# Patient Record
Sex: FEMALE | Race: WHITE | Employment: OTHER | URBAN - METROPOLITAN AREA
[De-identification: names, ages, dates, MRNs, and addresses within clinical notes are randomized per-mention and may not be internally consistent; named-entity substitution may affect disease eponyms.]

---

## 2024-04-17 NOTE — PROGRESS NOTES
[facility]      Patient not reached after multiple attempts to multiple numbers.   Pre-op instructions below left on [x] Voicemail/Number: [phone], message stated patient's last name. Requested patient call back to [phone].   Date: 4/30/24   Time: 1130  Arrival: 1000    1. Nothing to eat or drink after midnight-including, gum, candy, mints or ice. If colonoscopy, follow prep instructions. No alcohol, smoking or marijuana within 24 hours of surgery  2. If you normally take heart, blood pressure, seizure, breathing or thyroid medications in the morning please do so with a small sip of water. Use inhalers, bring rescue inhaler  3. Take half of your normal dose of any long-acting insulins the night before-do not take any diabetic medications the morning of surgery  4.  Follow your doctors instructions regarding blood thinners  5.  Bring a complete list of all your medications (prescriptions and over the counter), picture ID and insurance card. Leave all other valuables at home  6.  You need a responsible adult 18 or older to stay on site while you are here, drive you home and stay with you for 24 hours, they must have permission to receive your post-op instructions  7.  Follow any instructions your surgeon's office has given you  8.  Shower with antibacterial soap as directed by surgeon. Do not put anything on skin after including lotion or powders  9.  Wear loose, comfortable clothing. No jewelry, piercings or metal hair clips. No makeup or nail polish  10. Bring any assistive or medical devices that you may need and any the surgeon has advised are needed post operatively. If you use CPAP or oxygen, bring with you on the day of  procedure  11.  If you have a history of reactions to anesthesia, please notify us immediately  12. Call your surgeons office if you need to cancel for illness or any reason or for any further questions      [id number]

## 2024-04-29 ENCOUNTER — ANESTHESIA EVENT (OUTPATIENT)
Age: 79
End: 2024-04-29
Payer: MEDICARE

## 2024-04-29 RX ORDER — SODIUM CHLORIDE 0.9 % (FLUSH) 0.9 %
5-40 SYRINGE (ML) INJECTION EVERY 12 HOURS SCHEDULED
Status: CANCELLED | OUTPATIENT
Start: 2024-04-29

## 2024-04-29 RX ORDER — SODIUM CHLORIDE 9 MG/ML
INJECTION, SOLUTION INTRAVENOUS PRN
Status: CANCELLED | OUTPATIENT
Start: 2024-04-29

## 2024-04-29 RX ORDER — SODIUM CHLORIDE 0.9 % (FLUSH) 0.9 %
5-40 SYRINGE (ML) INJECTION PRN
Status: CANCELLED | OUTPATIENT
Start: 2024-04-29

## 2024-04-30 ENCOUNTER — HOSPITAL ENCOUNTER (OUTPATIENT)
Age: 79
Setting detail: OUTPATIENT SURGERY
Discharge: HOME | End: 2024-04-30
Attending: INTERNAL MEDICINE | Admitting: INTERNAL MEDICINE
Payer: MEDICARE

## 2024-04-30 ENCOUNTER — ANESTHESIA (OUTPATIENT)
Age: 79
End: 2024-04-30
Payer: MEDICARE

## 2024-04-30 VITALS
BODY MASS INDEX: 28.96 KG/M2 | RESPIRATION RATE: 26 BRPM | WEIGHT: 147.5 LBS | HEIGHT: 60 IN | TEMPERATURE: 98 F | OXYGEN SATURATION: 98 % | SYSTOLIC BLOOD PRESSURE: 156 MMHG | DIASTOLIC BLOOD PRESSURE: 100 MMHG | HEART RATE: 92 BPM

## 2024-04-30 DIAGNOSIS — R19.5 POSITIVE COLORECTAL CANCER SCREENING USING COLOGUARD TEST: ICD-10-CM

## 2024-04-30 LAB — GLUCOSE BLD-MCNC: 167 MG/DL (ref 70–99)

## 2024-04-30 PROCEDURE — 3700000001 HC ADD 15 MINUTES (ANESTHESIA): Performed by: INTERNAL MEDICINE

## 2024-04-30 PROCEDURE — 2580000003 HC RX 258: Performed by: ANESTHESIOLOGY

## 2024-04-30 PROCEDURE — 3609010600 HC COLONOSCOPY POLYPECTOMY SNARE/COLD BIOPSY: Performed by: INTERNAL MEDICINE

## 2024-04-30 PROCEDURE — 88305 TISSUE EXAM BY PATHOLOGIST: CPT

## 2024-04-30 PROCEDURE — 82962 GLUCOSE BLOOD TEST: CPT

## 2024-04-30 PROCEDURE — 2709999900 HC NON-CHARGEABLE SUPPLY: Performed by: INTERNAL MEDICINE

## 2024-04-30 PROCEDURE — 7100000011 HC PHASE II RECOVERY - ADDTL 15 MIN: Performed by: INTERNAL MEDICINE

## 2024-04-30 PROCEDURE — 6360000002 HC RX W HCPCS: Performed by: NURSE ANESTHETIST, CERTIFIED REGISTERED

## 2024-04-30 PROCEDURE — 7100000010 HC PHASE II RECOVERY - FIRST 15 MIN: Performed by: INTERNAL MEDICINE

## 2024-04-30 PROCEDURE — 3700000000 HC ANESTHESIA ATTENDED CARE: Performed by: INTERNAL MEDICINE

## 2024-04-30 RX ORDER — ATORVASTATIN CALCIUM 10 MG/1
10 TABLET, FILM COATED ORAL DAILY
COMMUNITY

## 2024-04-30 RX ORDER — PRAMIPEXOLE DIHYDROCHLORIDE 0.5 MG/1
TABLET ORAL
COMMUNITY
Start: 2024-02-13

## 2024-04-30 RX ORDER — NALOXONE HYDROCHLORIDE 0.4 MG/ML
INJECTION, SOLUTION INTRAMUSCULAR; INTRAVENOUS; SUBCUTANEOUS PRN
Status: DISCONTINUED | OUTPATIENT
Start: 2024-04-30 | End: 2024-04-30 | Stop reason: HOSPADM

## 2024-04-30 RX ORDER — LIDOCAINE HYDROCHLORIDE 20 MG/ML
INJECTION, SOLUTION INTRAVENOUS PRN
Status: DISCONTINUED | OUTPATIENT
Start: 2024-04-30 | End: 2024-04-30 | Stop reason: SDUPTHER

## 2024-04-30 RX ORDER — LISINOPRIL 2.5 MG/1
TABLET ORAL
COMMUNITY
Start: 2024-02-13

## 2024-04-30 RX ORDER — ESZOPICLONE 1 MG/1
TABLET, FILM COATED ORAL
COMMUNITY

## 2024-04-30 RX ORDER — DAPAGLIFLOZIN 10 MG/1
TABLET, FILM COATED ORAL
COMMUNITY
Start: 2024-02-28

## 2024-04-30 RX ORDER — PROPOFOL 10 MG/ML
INJECTION, EMULSION INTRAVENOUS PRN
Status: DISCONTINUED | OUTPATIENT
Start: 2024-04-30 | End: 2024-04-30 | Stop reason: SDUPTHER

## 2024-04-30 RX ORDER — PANTOPRAZOLE SODIUM 40 MG/1
TABLET, DELAYED RELEASE ORAL
COMMUNITY
Start: 2024-02-13

## 2024-04-30 RX ORDER — GLYCOPYRROLATE 0.2 MG/ML
INJECTION INTRAMUSCULAR; INTRAVENOUS PRN
Status: DISCONTINUED | OUTPATIENT
Start: 2024-04-30 | End: 2024-04-30 | Stop reason: SDUPTHER

## 2024-04-30 RX ORDER — ONDANSETRON 2 MG/ML
4 INJECTION INTRAMUSCULAR; INTRAVENOUS
Status: DISCONTINUED | OUTPATIENT
Start: 2024-04-30 | End: 2024-04-30 | Stop reason: HOSPADM

## 2024-04-30 RX ORDER — LORAZEPAM 0.5 MG/1
0.5 TABLET ORAL
COMMUNITY
Start: 2024-04-09

## 2024-04-30 RX ORDER — GLIPIZIDE 5 MG/1
TABLET ORAL
COMMUNITY
Start: 2024-02-28

## 2024-04-30 RX ORDER — SODIUM CHLORIDE 0.9 % (FLUSH) 0.9 %
5-40 SYRINGE (ML) INJECTION EVERY 12 HOURS SCHEDULED
Status: DISCONTINUED | OUTPATIENT
Start: 2024-04-30 | End: 2024-04-30 | Stop reason: HOSPADM

## 2024-04-30 RX ORDER — ROSUVASTATIN CALCIUM 40 MG/1
TABLET, COATED ORAL
COMMUNITY
Start: 2024-02-28

## 2024-04-30 RX ORDER — ONDANSETRON 4 MG/1
TABLET, FILM COATED ORAL
COMMUNITY
Start: 2017-07-15

## 2024-04-30 RX ORDER — VENLAFAXINE HYDROCHLORIDE 150 MG/1
150 CAPSULE, EXTENDED RELEASE ORAL
COMMUNITY
Start: 2024-04-15

## 2024-04-30 RX ORDER — SODIUM CHLORIDE 9 MG/ML
INJECTION, SOLUTION INTRAVENOUS PRN
Status: DISCONTINUED | OUTPATIENT
Start: 2024-04-30 | End: 2024-04-30 | Stop reason: HOSPADM

## 2024-04-30 RX ORDER — SODIUM CHLORIDE 0.9 % (FLUSH) 0.9 %
5-40 SYRINGE (ML) INJECTION PRN
Status: DISCONTINUED | OUTPATIENT
Start: 2024-04-30 | End: 2024-04-30 | Stop reason: HOSPADM

## 2024-04-30 RX ORDER — SCOLOPAMINE TRANSDERMAL SYSTEM 1 MG/1
1 PATCH, EXTENDED RELEASE TRANSDERMAL PRN
COMMUNITY
Start: 2017-05-06

## 2024-04-30 RX ORDER — PROMETHAZINE HYDROCHLORIDE 12.5 MG/1
12.5 TABLET ORAL EVERY 8 HOURS PRN
COMMUNITY
Start: 2021-10-14

## 2024-04-30 RX ORDER — DROPERIDOL 2.5 MG/ML
0.62 INJECTION, SOLUTION INTRAMUSCULAR; INTRAVENOUS
Status: DISCONTINUED | OUTPATIENT
Start: 2024-04-30 | End: 2024-04-30 | Stop reason: HOSPADM

## 2024-04-30 RX ADMIN — PROPOFOL 40 MG: 10 INJECTION, EMULSION INTRAVENOUS at 11:30

## 2024-04-30 RX ADMIN — PROPOFOL 140 MCG/KG/MIN: 10 INJECTION, EMULSION INTRAVENOUS at 11:31

## 2024-04-30 RX ADMIN — LIDOCAINE HYDROCHLORIDE 100 MG: 20 INJECTION, SOLUTION INTRAVENOUS at 11:30

## 2024-04-30 RX ADMIN — GLYCOPYRROLATE 0.1 MG: 0.2 INJECTION, SOLUTION INTRAMUSCULAR; INTRAVENOUS at 11:23

## 2024-04-30 RX ADMIN — PROPOFOL 10 MG: 10 INJECTION, EMULSION INTRAVENOUS at 11:50

## 2024-04-30 RX ADMIN — SODIUM CHLORIDE: 9 INJECTION, SOLUTION INTRAVENOUS at 11:11

## 2024-04-30 ASSESSMENT — LIFESTYLE VARIABLES: SMOKING_STATUS: 1

## 2024-04-30 ASSESSMENT — PAIN - FUNCTIONAL ASSESSMENT: PAIN_FUNCTIONAL_ASSESSMENT: 0-10

## 2024-04-30 NOTE — PROGRESS NOTES
Ambulatory Surgery/Procedure Discharge Note    Vitals:    04/30/24 1225   BP: (!) 156/100   Pulse: 92   Resp: 26   Temp: 98 °F (36.7 °C)   SpO2: 98%       In: 400 [I.V.:400]  Out: -       Pain assessment:  none  Pain Level: 0  Pt states \"ready to go home\". Pt alert and oriented x4. IV removed. Denies N/V, tolerating PO intake. Discharge instructions given to pt and  ,verbalized understanding of all instructions. Left with all belongings and discharge instructions.       Patient discharged to home/self care. Patient discharged via wheel chair by RN to waiting family/S.O.       4/30/2024 12:47 PM

## 2024-04-30 NOTE — DISCHARGE INSTRUCTIONS
ENDOSCOPY DISCHARGE INSTRUCTIONS:    Call the physician that did your procedure for any questions or concern:    [facility]: [phone]  DR. LIBORIO SORIA     ACTIVITY:    There are potential side effects to the medications used for sedation and anesthesia during your procedure.  These include:  Dizziness or light-headedness, confusion or memory loss, delayed reaction times, loss of coordination, nausea and vomiting.  Because of your increased risk for injury, we ask that you observe the following precautions:  For the next 24 hours,  DO NOT operate an automobile, bicycle, motorcycle, , power tools or large equipment of any kind.  Do not drink alcohol, sign any legal documents or make any legal decisions for 24 hours.  Do not bend your head over lower than your heart.  DO sit on the side of bed/couch awhile before getting up.  Plan on bedrest or quiet relaxation today.  You may resume normal activities in 24 hours.    DIET:    Your first meal today should be light, avoiding spicy and fatty foods.  If you tolerate this first meal, then you may advance to your regular diet unless otherwise advised by your physician.    NORMAL SYMPTOMS:  -Mild sore throat if you’ve had an EGD   -Gaseous discomfort    NOTIFY YOUR PHYSICIAN IF THESE SYMPTOMS OCCUR:  1. Fever (greater than 100)  5. Increased abdominal bloating  2. Severe pain    6. Excessive bleeding  3. Nausea and vomiting  7. Chest pain                                                                    4. Chills    8. Shortness of breath    ADDITIONAL INSTRUCTIONS:    Biopsy results: Call [facility] for biopsy results in 1 week    Educational Information:    Repeat colonoscopy in 3years.    Your I.V. Site: Care Instructions  Overview     Medicines or fluids may be given through an intravenous (I.V.) tube inserted into a vein. The I.V. is most often placed in the back of the hand, on the forearm, or on the inside of the elbow.  When the I.V. is in place,

## 2024-04-30 NOTE — H&P
Gastroenterology Note             Pre-operative History and Physical    Patient: Dalia Gomez  : 1945  CSN:     History Obtained From:  patient and/or guardian.     HISTORY OF PRESENT ILLNESS:    The patient is a 79 y.o. female  here for /colonoscopy.  This very pleasant 79-year-old female who comes in today she has been found to have a positive Cologuard test and we need to do a    Past Medical History:    Past Medical History:   Diagnosis Date    Diabetes mellitus (HCC)     Hyperlipidemia     Hypertension      Past Surgical History:    Past Surgical History:   Procedure Laterality Date    FRACTURE SURGERY       Medications Prior to Admission:   No current facility-administered medications on file prior to encounter.     Current Outpatient Medications on File Prior to Encounter   Medication Sig Dispense Refill    LORazepam (ATIVAN) 0.5 MG tablet 1 tablet.      lisinopril (PRINIVIL;ZESTRIL) 2.5 MG tablet TAKE 1 TABLET BY MOUTH EVERY DAY Oral for 90 Days      ondansetron (ZOFRAN) 4 MG tablet TAKE 1 TABLET BY MOUTH EVERY 8 HOURS AS NEEDED FOR NAUSEA OR VOMITING Oral for 7 Days      pantoprazole (PROTONIX) 40 MG tablet Oral for 90 Days      pramipexole (MIRAPEX) 0.5 MG tablet TAKE 1 TABLET BY MOUTH AT BEDTIME FOR RESTLESS LEG SYNDROME Oral for 90 Days      promethazine (PHENERGAN) 12.5 MG tablet Take 1 tablet by mouth every 8 hours as needed      rosuvastatin (CRESTOR) 40 MG tablet Oral for 90 Days      scopolamine (TRANSDERM SCOP, 1.5 MG,) transdermal patch Place 1 patch onto the skin as needed      venlafaxine (EFFEXOR XR) 150 MG extended release capsule Take 1 capsule by mouth daily (with breakfast)      dapagliflozin (FARXIGA) 10 MG tablet Oral for 90 Days      glipiZIDE (GLUCOTROL) 5 MG tablet TAKE 1 TABLET BY MOUTH 2 TIMES A DAY BEFORE MEALS Oral for 90 Days      atorvastatin (LIPITOR) 10 MG tablet Take 1 tablet by mouth daily      METFORMIN HCL PO Take by mouth daily      eszopiclone (LUNESTA) 1

## 2024-04-30 NOTE — ANESTHESIA POSTPROCEDURE EVALUATION
Department of Anesthesiology  Postprocedure Note    Patient: Dalia Gomez  MRN: [id number]  YOB: 1945  Date of evaluation: 4/30/2024    Procedure Summary       Date: 04/30/24 Room / Location: [address]    Anesthesia Start: 1123 Anesthesia Stop: 1159    Procedure: COLONOSCOPY POLYPECTOMY SNARE BIOPSY Diagnosis:       Positive colorectal cancer screening using Cologuard test      (Positive colorectal cancer screening using Cologuard test [R19.5])    Surgeons: Bahman Mayo MD Responsible Provider: Eber Driscoll MD    Anesthesia Type: MAC ASA Status: 3            Anesthesia Type: No value filed.    Louis Phase I: Louis Score: 10    Louis Phase II: Louis Score: 10    Anesthesia Post Evaluation    Patient location during evaluation: PACU  Patient participation: complete - patient participated  Level of consciousness: awake  Airway patency: patent  Nausea & Vomiting: no nausea and no vomiting  Cardiovascular status: blood pressure returned to baseline  Respiratory status: acceptable  Hydration status: stable  Comments: Vital signs stable  OK to discharge from Stage I post anesthesia care.  Care transferred from Anesthesiology department on discharge from perioperative area   Multimodal analgesia pain management approach  Pain management: satisfactory to patient    No notable events documented.

## 2024-04-30 NOTE — PROGRESS NOTES
Pt arrived to preop room # 4 from endo. Report received from Endo RN and CRNA. Pt resting quietly in bed, respirations even and unlabored. Attached to Preop monitoring system. Alarms and parameters set. Will continue to monitor pt closely. Call light in reach.

## 2024-04-30 NOTE — PROGRESS NOTES
Patient wedding band/engagement ring, watch, and 3 necklaces placed in container and put in patients purse.  Consent signed to leave earrings (2 pair in each ear) in place during procedure.

## 2024-04-30 NOTE — ANESTHESIA PRE PROCEDURE
\"[facility]\"    Drug/Infectious Status (If Applicable):  No results found for: \"HIV\", \"HEPCAB\"    COVID-19 Screening (If Applicable): No results found for: \"COVID19\"        Anesthesia Evaluation  Patient summary reviewed   no history of anesthetic complications:   Airway: Mallampati: III  TM distance: >3 FB   Neck ROM: full  Mouth opening: > = 3 FB   Dental: normal exam         Pulmonary: breath sounds clear to auscultation  (+)           current smoker                           Cardiovascular:  Exercise tolerance: no interval change, Has poor balance and uses a rollater  (+) hypertension:, CAD (coronary artery calcification):, hyperlipidemia        Rhythm: regular  Rate: normal                    Neuro/Psych:   (+) neuromuscular disease (RLS):            GI/Hepatic/Renal:   (+) GERD:, bowel prep     (-) liver disease and no renal disease       Endo/Other:    (+) DiabetesType II DM.                 Abdominal:             Vascular: negative vascular ROS.         Other Findings:         Anesthesia Plan      MAC     ASA 3       Induction: intravenous.      Anesthetic plan and risks discussed with patient.      Plan discussed with CRNA.                  Eber Driscoll MD   4/30/2024

## 2024-05-01 LAB — SURGICAL PATHOLOGY REPORT: NORMAL

## (undated) DEVICE — FORCEPS BX L240CM JAW DIA2.4MM ORNG L CAP W/ NDL DISP RAD

## (undated) DEVICE — ENDOSCOPIC KIT 1.1+ 6 FT 2 GWN AAMI LEVEL 3

## (undated) DEVICE — AIR/WATER CLEANING ADAPTER FOR OLYMPUS® GI ENDOSCOPE: Brand: BULLDOG®

## (undated) DEVICE — TUBING, SUCTION, 1/4" X 12', STRAIGHT: Brand: MEDLINE

## (undated) DEVICE — SNARE COLD DIAMOND 10MM THIN

## (undated) DEVICE — SYRINGE, LUER SLIP, STERILE, 60ML: Brand: MEDLINE

## (undated) DEVICE — SOLUTION IRRIG 1000ML STRL H2O USP PLAS POUR BTL

## (undated) DEVICE — TRAP SPEC RETRV CLR PLAS POLYP IN LN SUCT QUIK CTCH

## (undated) DEVICE — SINGLE USE AIR/WATER, SUCTION AND BIOPSY VALVES SET: Brand: ORCAPOD™

## (undated) DEVICE — BW-412T DISP COMBO CLEANING BRUSH: Brand: SINGLE USE COMBINATION CLEANING BRUSH